# Patient Record
Sex: FEMALE | Race: OTHER | URBAN - METROPOLITAN AREA
[De-identification: names, ages, dates, MRNs, and addresses within clinical notes are randomized per-mention and may not be internally consistent; named-entity substitution may affect disease eponyms.]

---

## 2022-06-25 ENCOUNTER — EMERGENCY (EMERGENCY)
Facility: HOSPITAL | Age: 37
LOS: 0 days | Discharge: HOME | End: 2022-06-25
Attending: EMERGENCY MEDICINE | Admitting: EMERGENCY MEDICINE

## 2022-06-25 VITALS
OXYGEN SATURATION: 96 % | RESPIRATION RATE: 18 BRPM | HEART RATE: 89 BPM | DIASTOLIC BLOOD PRESSURE: 60 MMHG | SYSTOLIC BLOOD PRESSURE: 130 MMHG | TEMPERATURE: 98 F

## 2022-06-25 DIAGNOSIS — S92.354A NONDISPLACED FRACTURE OF FIFTH METATARSAL BONE, RIGHT FOOT, INITIAL ENCOUNTER FOR CLOSED FRACTURE: ICD-10-CM

## 2022-06-25 DIAGNOSIS — M79.89 OTHER SPECIFIED SOFT TISSUE DISORDERS: ICD-10-CM

## 2022-06-25 DIAGNOSIS — M79.671 PAIN IN RIGHT FOOT: ICD-10-CM
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
## 2022-06-25 DIAGNOSIS — S80.211A ABRASION, RIGHT KNEE, INITIAL ENCOUNTER: ICD-10-CM
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
## 2022-06-25 PROCEDURE — 99284 EMERGENCY DEPT VISIT MOD MDM: CPT | Mod: 25

## 2022-06-25 PROCEDURE — 73630 X-RAY EXAM OF FOOT: CPT | Mod: 26,RT

## 2022-06-25 PROCEDURE — 29515 APPLICATION SHORT LEG SPLINT: CPT

## 2022-06-25 RX ORDER — IBUPROFEN 200 MG
600 TABLET ORAL ONCE
Refills: 0 | Status: COMPLETED | OUTPATIENT
Start: 2022-06-25 | End: 2022-06-25

## 2022-06-25 RX ADMIN — Medication 600 MILLIGRAM(S): at 14:49

## 2022-06-25 NOTE — ED PROVIDER NOTE - CARE PROVIDER_API CALL
Jose Luis Coreas (MD)  Orthopaedic Surgery  [address] [place] [address]  Phone: [phone]  Fax: [phone]  Follow Up Time: 1-3 Days

## 2022-06-25 NOTE — ED PROVIDER NOTE - NSFOLLOWUPINSTRUCTIONS_ED_ALL_ED_FT
Metatarsal Fracture  A metatarsal fracture is a break in one of the five bones that connect the toes to the rest of the foot. This may also be called a forefoot fracture. A metatarsal fracture may be:  A crack in the surface of the bone (stress fracture). This often occurs in athletes.A break all the way through the bone (complete fracture).The bone that connects to the little toe (fifth metatarsal) is most commonly fractured. Ballet dancers often fracture this bone.  What are the causes?  A metatarsal fracture may be caused by:  Sudden twisting of the foot.Falling onto the foot.Something heavy falling onto the foot.Overuse or repetitive exercise.What increases the risk?  This condition is more likely to develop in people who:  Play contact sports.Do ballet.Have a condition that causes the bones to become thin and brittle (osteoporosis).Have a low calcium level.What are the signs or symptoms?  Symptoms of this condition include:  Pain that gets worse when walking or standing.Pain when pressing on the foot or moving the toes.Swelling.Bruising on the top or bottom of the foot.How is this diagnosed?  This condition may be diagnosed based on:  Your symptoms. Any recent foot injuries you have had. A physical exam.An X-ray of your foot. If you have a stress fracture, it may not show up on an X-ray, and you may need other imaging tests, such as:   A bone scan.CT scan. MRI.How is this treated?  Treatment depends on how severe your fracture is and how the pieces of the broken bone line up with each other (alignment). Treatment may involve:  Wearing a cast, splint, or supportive boot on your foot.Using crutches, and not putting any weight on your foot.Having surgery to align broken bones (open reduction and internal fixation, ORIF).Physical therapy.Follow-up visits and X-rays to make sure you are healing.Follow these instructions at home:  If you have a splint or a supportive boot:     Wear the splint or boot as told by your health care provider. Remove it only as told by your health care provider.Loosen the splint or boot if your toes tingle, become numb, or turn cold and blue. Keep the splint or boot clean. If your splint or boot is not waterproof:   Do not let it get wet. Cover it with a watertight covering when you take a bath or a shower.If you have a cast:     Do not stick anything inside the cast to scratch your skin. Doing that increases your risk for infection.Check the skin around the cast every day. Tell your health care provider about any concerns.You may put lotion on dry skin around the edges of the cast. Do not put lotion on the skin underneath the cast.Keep the cast clean.If the cast is not waterproof:   Do not let it get wet.Cover it with a watertight covering when you take a bath or a shower.Activity     Do not use your affected leg to support your body weight until your health care provider says that you can. Use crutches as directed.Ask your health care provider what activities are safe for you during recovery, and ask what activities you need to avoid.Do physical therapy exercises as directed.Driving     Do not drive or use heavy machinery while taking pain medicine.Do not drive while wearing a cast, splint, or boot on a foot that you use for driving.Managing pain, stiffness, and swelling        If directed, put ice on painful areas:  Put ice in a plastic bag.Place a towel between your skin and the bag.  If you have a removable splint or boot, remove it as told by your health care provider.If you have a cast, place a towel between your cast and the bag.Leave the ice on for 20 minutes, 2–3 times a day.Move your toes often to avoid stiffness and to lessen swelling.Raise (elevate) your lower leg above the level of your heart while you are sitting or lying down.General instructions     Do not put pressure on any part of the cast or splint until it is fully hardened. This may take several hours.Take over-the-counter and prescription medicines only as told by your health care provider.Do not use any products that contain nicotine or tobacco, such as cigarettes and e-cigarettes. These can delay bone healing. If you need help quitting, ask your health care provider.Do not take baths, swim, or use a hot tub until your health care provider approves. Ask your health care provider if you may take showers.Keep all follow-up visits as told by your health care provider. This is important.Contact a health care provider if you have:  Pain that gets worse or does not get better with medicine. A fever.A bad smell coming from your cast or splint.Get help right away if you have:  Any of the following in your toes or your foot, even after loosening your splint (if applicable):   Numbness. Tingling. Coldness.Blue skin. Redness or swelling that gets worse.Pain that suddenly becomes severe.Summary  A metatarsal fracture is a break in one of the five bones that connect the toes to the rest of the foot.Treatment depends on how severe your fracture is and how the pieces of the broken bone line up with each other (alignment). This may include wearing a cast, splint, or supportive boot, or using crutches. Sometimes surgery is needed to align the bones.Ice and elevate your foot to help lessen the pain and swelling.Make sure you know what symptoms should cause you to get help right away.This information is not intended to replace advice given to you by your health care provider. Make sure you discuss any questions you have with your health care provider.    Document Released: 09/09/2003 Document Revised: 04/09/2020 Document Reviewed: 01/14/2019  Elsevier Patient Education © 2020 [employer]

## 2022-06-25 NOTE — ED PROVIDER NOTE - SKIN, MLM
+ abrasion along anterior aspect of left knee ; remainder of visualized skin normal color for race, warm, dry and intact. No evidence of rash.

## 2022-06-25 NOTE — ED PROVIDER NOTE - CLINICAL SUMMARY MEDICAL DECISION MAKING FREE TEXT BOX
37 female here for fall with right foot pain and swelling. Found to have new metatarsal fracture. Had screening exam, supportive care, imaging, immobilization, symptoms improved, plan discussed with patient, will discharge with outpatient management and return and follow up instructions.

## 2022-06-25 NOTE — ED PROVIDER NOTE - NS ED ATTENDING STATEMENT MOD
This was a shared visit with the KARLY. I reviewed and verified the documentation and independently performed the documented:

## 2022-06-25 NOTE — ED PROVIDER NOTE - OBJECTIVE STATEMENT
36 y/o F, no significant PMHx, presents to the ED with complaints of right foot pain s/p mechanical fall yesterday. Patient tripped and fell with resultant swelling and tenderness along lateral aspect of right foot that is exacerbated upon ambulation 36 y/o F, no significant PMHx, presents to the ED with complaints of right foot pain s/p mechanical fall yesterday. Patient tripped and fell with resultant swelling and tenderness along lateral aspect of right foot that is exacerbated upon ambulation. She sustained abrasion to left knee; denies additional injuries including head trauma, ankle pain, knee pain and hip pain.

## 2022-06-25 NOTE — ED PROVIDER NOTE - MUSCULOSKELETAL, MLM
+ tenderness and swelling along lateral aspect of right foot ; full ROM exhibited to b/l knees, ankles and hips ; spine appears normal

## 2022-06-25 NOTE — ED PROVIDER NOTE - ATTENDING APP SHARED VISIT CONTRIBUTION OF CARE
I personally evaluated the patient. I reviewed the Resident’s or Physician Assistant’s note (as assigned above), and agree with the findings and plan except as documented in my note.     37 female here for mechanical trip and fall earlier today with injury to right foot lateral aspect and left knee abrasion. Admits to inversion injury at the right foot. Unable to bear weight in ED. Admits to swelling and bruising to the location.     Tetanus status unsure but had a child 5 years ago.     ROS otherwise unremarkable    PE: female in no distress. CV: pulses intact. CHEST: normal work of breathing. SKIN: left knee abrasion noted EXT: right foot lateral aspect bruising and swelling noted. tenderness to palpation at 5th and 4th MT area. Distally NVI.  NEURO: AAO 3 no focal deficits. HEENT: mucosa normal     Impression: fall, right foot injury    Plan: imaging wound management immobilization supportive care and reevaluation

## 2022-06-27 ENCOUNTER — APPOINTMENT (OUTPATIENT)
Dept: ORTHOPEDIC SURGERY | Facility: CLINIC | Age: 37
End: 2022-06-27

## 2022-06-27 VITALS — HEIGHT: 67 IN | WEIGHT: 200 LBS | BODY MASS INDEX: 31.39 KG/M2

## 2022-06-27 DIAGNOSIS — Z78.9 OTHER SPECIFIED HEALTH STATUS: ICD-10-CM

## 2022-06-27 DIAGNOSIS — S92.354A NONDISPLACED FRACTURE OF FIFTH METATARSAL BONE, RIGHT FOOT, INITIAL ENCOUNTER FOR CLOSED FRACTURE: ICD-10-CM

## 2022-06-27 PROCEDURE — 29540 STRAPPING ANKLE &/FOOT: CPT

## 2022-06-27 PROCEDURE — 99203 OFFICE O/P NEW LOW 30 MIN: CPT | Mod: 25

## 2022-06-27 NOTE — DISCUSSION/SUMMARY
[de-identified] :   At this time I placed her in a short Cam walker boot.  I discussed with her she can weightbear as tolerated in the boot.  She can take it off for sleeping and bathing.  Rest, ice, elevate, Tylenol or Motrin as needed for pain.  She can also do warm soaks with Epsom salts.  Will see her back in about 3 weeks for repeat x-rays and evaluation.  I discussed with her she will be in the boot for about 4 weeks. Patient will call me if any other problems or concerns.  Patient verbalized understanding and agreed with the plan, all questions were answered in the office today.\par \par This patient was seen under the supervision of Dr. Coreas.\par

## 2022-06-27 NOTE — IMAGING
[de-identified] : On examination of her right foot she has swelling and ecchymosis of the dorsal lateral aspect of her foot.  She has no erythema.  Skin is intact.  She is tender to palpation over the 5th metatarsal.  She is nontender over the rest of the metatarsals or the Lisfranc joint.  She has no tenderness palpation of the calcaneus.  She is nontender over the medial or lateral malleolus.  She is nontender over the ATFL, CFL, PT FL or deltoid ligament.  She is nontender over the talar dome.  She is nontender over the Achilles, negative Fitzgerald's test, no calf tenderness.  She has good range of motion of the ankle.  Sensation is intact throughout, 2+ DP and PT pulses.\par \par X-rays of the right foot taken at [facility], reviewed in the office today show a nondisplaced base of the 5th metatarsal fracture.  No other fractures, dislocations or other bony abnormalities noted.

## 2022-06-27 NOTE — HISTORY OF PRESENT ILLNESS
[de-identified] : 37-year-old female is here for evaluation of her right foot.  Patient states on Friday she was walking in the street and there was a pothole, she stepped in it, rolled her foot and fell.  After the fall she is having pain and difficulty walking, she went to the emergency room and had x-rays taken and was told she had a fracture.  She was placed in a splint.  She has been nonweightbearing with crutches.  She states she has not been taking anything for the pain because she is breast-feeding.  She denies any pain in the ankle.  She denies any numbness tingling in the foot or any calf pain.  She is here today for repeat evaluation.

## 2022-07-22 ENCOUNTER — APPOINTMENT (OUTPATIENT)
Dept: ORTHOPEDIC SURGERY | Facility: CLINIC | Age: 37
End: 2022-07-22

## 2022-07-22 VITALS — BODY MASS INDEX: 31.39 KG/M2 | HEIGHT: 67 IN | WEIGHT: 200 LBS

## 2022-07-22 PROCEDURE — 99213 OFFICE O/P EST LOW 20 MIN: CPT

## 2022-07-22 PROCEDURE — 73630 X-RAY EXAM OF FOOT: CPT | Mod: RT

## 2022-07-22 NOTE — IMAGING
[de-identified] : On examination of her right foot she has minimal swelling,   No ecchymosis  She has no erythema.  Skin is intact.  She is  mildly tender to palpation over the 5th metatarsal.  She is nontender over the rest of the metatarsals or the Lisfranc joint.  She has no tenderness palpation of the calcaneus.  She is nontender over the medial or lateral malleolus.  She is nontender over the ATFL, CFL, PT FL or deltoid ligament.  She is nontender over the talar dome.  She is nontender over the Achilles, negative Fitzgerald's test, no calf tenderness.  She has good range of motion of the ankle.  Sensation is intact throughout, 2+ DP and PT pulses.\par \par X-rays of the right foot, weight-bearing, taken in the office today show a well-healing base of the 5th metatarsal fracture.  The alignment is unchanged.  There is some more callus formation.

## 2022-07-22 NOTE — HISTORY OF PRESENT ILLNESS
[de-identified] : 37-year-old female is here for follow up evaluation of her right foot, 5th metatarsal fracture. Patient had an inversion injury of the foot about 4 weeks ago, she was seen in the walk-in.  I placed her in a CAM walker boot.  She has been weight-bearing in the boot.  She states the pain has improved significantly, she still has some mild pain but is doing well.  She denies any pain in the ankle.  She denies any numbness tingling in the foot or any calf pain.  She is here today for repeat evaluation.

## 2022-07-22 NOTE — DISCUSSION/SUMMARY
[de-identified] :   At this time she can come out of the Cam walker boot into a stiff-soled shoe.  She can continue to weightbear as tolerated.  No high impact activity.  I will see her back in 4-6 weeks for repeat x-rays and evaluation. Patient will call me if any other problems or concerns.  Patient verbalized understanding and agreed with the plan, all questions were answered in the office today.\par \par Patient was seen under the supervision of Dr. Patel.\par

## 2022-09-16 ENCOUNTER — RESULT CHARGE (OUTPATIENT)
Age: 37
End: 2022-09-16

## 2022-09-16 ENCOUNTER — APPOINTMENT (OUTPATIENT)
Dept: ORTHOPEDIC SURGERY | Facility: CLINIC | Age: 37
End: 2022-09-16

## 2022-09-16 VITALS — BODY MASS INDEX: 31.39 KG/M2 | HEIGHT: 67 IN | WEIGHT: 200 LBS

## 2022-09-16 DIAGNOSIS — S92.354D NONDISPLACED FRACTURE OF FIFTH METATARSAL BONE, RIGHT FOOT, SUBSEQUENT ENCOUNTER FOR FRACTURE WITH ROUTINE HEALING: ICD-10-CM

## 2022-09-16 PROCEDURE — 99213 OFFICE O/P EST LOW 20 MIN: CPT

## 2022-09-16 PROCEDURE — 73630 X-RAY EXAM OF FOOT: CPT | Mod: RT

## 2022-09-16 NOTE — DISCUSSION/SUMMARY
[de-identified] :   At this time I discussed with her she seems to also have some plantar fasciitis.  I recommend she continue to do the physical therapy and work on stretching of the foot.  She can roll her foot on a frozen water bottle or do some warm soaks with Epsom salts to help with the inflammation.  She is currently breast-feeding so she cannot take anti-inflammatories.  I will see her back in about 6 weeks for repeat x-rays and evaluation. Patient will call me if any other problems or concerns.  Patient verbalized understanding and agreed with the plan, all questions were answered in the office today.\par

## 2022-09-16 NOTE — IMAGING
[de-identified] : On examination of her right foot she has no swelling, no erythema, no ecchymosis..  Skin is intact.  She is  nontender to palpation over the 5th metatarsal.  She is nontender over the rest of the metatarsals or the Lisfranc joint.  She has  tenderness over the plantar fascia and the plantar aspect of the calcaneus. She is nontender over the medial or lateral malleolus.  She is nontender over the ATFL, CFL, PT FL or deltoid ligament.  She is nontender over the talar dome.  She is nontender over the Achilles, negative Fitzgerald's test, no calf tenderness.  She has good range of motion of the ankle.  Sensation is intact throughout, 2+ DP and PT pulses.\par \par X-rays of the right foot, weight-bearing, taken in the office today show a well-healing base of the 5th metatarsal fracture.  The alignment is unchanged.  There is some more callus formation.

## 2022-09-16 NOTE — HISTORY OF PRESENT ILLNESS
[de-identified] : 37-year-old female is here for follow up evaluation of her right foot, 5th metatarsal fracture. Patient had an inversion injury of the foot about 3 months ago.  She has been out of the boot and weight-bearing as tolerated.  She states she has no pain in the area of the fracture but has been having pain in the bottom of the foot and the heel.  She states she started going for physical therapy.  She denies any new injury or trauma.  She denies any numbness tingling in the foot or any calf pain.

## 2022-10-28 ENCOUNTER — APPOINTMENT (OUTPATIENT)
Dept: ORTHOPEDIC SURGERY | Facility: CLINIC | Age: 37
End: 2022-10-28

## 2023-10-13 ENCOUNTER — APPOINTMENT (OUTPATIENT)
Dept: UROGYNECOLOGY | Facility: CLINIC | Age: 38
End: 2023-10-13
Payer: MEDICAID

## 2023-10-13 VITALS
HEART RATE: 60 BPM | DIASTOLIC BLOOD PRESSURE: 65 MMHG | SYSTOLIC BLOOD PRESSURE: 101 MMHG | HEIGHT: 67 IN | WEIGHT: 200 LBS | BODY MASS INDEX: 31.39 KG/M2

## 2023-10-13 DIAGNOSIS — Z78.9 OTHER SPECIFIED HEALTH STATUS: ICD-10-CM

## 2023-10-13 DIAGNOSIS — B97.7 PAPILLOMAVIRUS AS THE CAUSE OF DISEASES CLASSIFIED ELSEWHERE: ICD-10-CM

## 2023-10-13 DIAGNOSIS — Z83.3 FAMILY HISTORY OF DIABETES MELLITUS: ICD-10-CM

## 2023-10-13 DIAGNOSIS — Z82.5 FAMILY HISTORY OF ASTHMA AND OTHER CHRONIC LOWER RESPIRATORY DISEASES: ICD-10-CM

## 2023-10-13 DIAGNOSIS — N39.3 STRESS INCONTINENCE (FEMALE) (MALE): ICD-10-CM

## 2023-10-13 DIAGNOSIS — R35.1 NOCTURIA: ICD-10-CM

## 2023-10-13 DIAGNOSIS — Z80.3 FAMILY HISTORY OF MALIGNANT NEOPLASM OF BREAST: ICD-10-CM

## 2023-10-13 DIAGNOSIS — Z80.0 FAMILY HISTORY OF MALIGNANT NEOPLASM OF DIGESTIVE ORGANS: ICD-10-CM

## 2023-10-13 DIAGNOSIS — N94.89 OTHER SPECIFIED CONDITIONS ASSOCIATED WITH FEMALE GENITAL ORGANS AND MENSTRUAL CYCLE: ICD-10-CM

## 2023-10-13 DIAGNOSIS — R10.2 PELVIC AND PERINEAL PAIN: ICD-10-CM

## 2023-10-13 PROCEDURE — 51701 INSERT BLADDER CATHETER: CPT

## 2023-10-13 PROCEDURE — 99205 OFFICE O/P NEW HI 60 MIN: CPT | Mod: 25

## 2023-10-13 RX ORDER — CYCLOBENZAPRINE HYDROCHLORIDE 5 MG/1
5 TABLET, FILM COATED ORAL
Qty: 60 | Refills: 0 | Status: ACTIVE | COMMUNITY
Start: 2023-10-13 | End: 1900-01-01

## 2023-10-16 LAB
APPEARANCE: CLEAR
BILIRUBIN URINE: NEGATIVE
BLOOD URINE: NEGATIVE
COLOR: YELLOW
GLUCOSE QUALITATIVE U: NEGATIVE MG/DL
KETONES URINE: NEGATIVE MG/DL
LEUKOCYTE ESTERASE URINE: NEGATIVE
NITRITE URINE: NEGATIVE
PH URINE: 6.5
PROTEIN URINE: NEGATIVE MG/DL
SPECIFIC GRAVITY URINE: 1.01
URINE CULTURE <10: NORMAL
UROBILINOGEN URINE: 0.2 MG/DL

## 2023-11-20 ENCOUNTER — APPOINTMENT (OUTPATIENT)
Dept: UROGYNECOLOGY | Facility: CLINIC | Age: 38
End: 2023-11-20

## 2024-01-20 ENCOUNTER — EMERGENCY (EMERGENCY)
Facility: HOSPITAL | Age: 39
LOS: 0 days | Discharge: HOME | End: 2024-01-21
Attending: EMERGENCY MEDICINE
Payer: MEDICAID

## 2024-01-20 VITALS
TEMPERATURE: 99 F | WEIGHT: 199.96 LBS | OXYGEN SATURATION: 98 % | HEART RATE: 83 BPM | RESPIRATION RATE: 18 BRPM | SYSTOLIC BLOOD PRESSURE: 137 MMHG | DIASTOLIC BLOOD PRESSURE: 81 MMHG
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
## 2024-01-20 DIAGNOSIS — S51.012A LACERATION WITHOUT FOREIGN BODY OF LEFT ELBOW, INITIAL ENCOUNTER: ICD-10-CM

## 2024-01-20 DIAGNOSIS — Z23 ENCOUNTER FOR IMMUNIZATION: ICD-10-CM

## 2024-01-20 DIAGNOSIS — M25.522 PAIN IN LEFT ELBOW: ICD-10-CM

## 2024-01-20 PROCEDURE — 99284 EMERGENCY DEPT VISIT MOD MDM: CPT | Mod: 25

## 2024-01-20 PROCEDURE — 73080 X-RAY EXAM OF ELBOW: CPT | Mod: 26,LT

## 2024-01-20 PROCEDURE — 73080 X-RAY EXAM OF ELBOW: CPT | Mod: LT

## 2024-01-20 PROCEDURE — 90471 IMMUNIZATION ADMIN: CPT

## 2024-01-20 PROCEDURE — 90715 TDAP VACCINE 7 YRS/> IM: CPT

## 2024-01-20 PROCEDURE — 12002 RPR S/N/AX/GEN/TRNK2.6-7.5CM: CPT

## 2024-01-20 PROCEDURE — 99283 EMERGENCY DEPT VISIT LOW MDM: CPT | Mod: 25

## 2024-01-20 RX ORDER — TETANUS TOXOID, REDUCED DIPHTHERIA TOXOID AND ACELLULAR PERTUSSIS VACCINE, ADSORBED 5; 2.5; 8; 8; 2.5 [IU]/.5ML; [IU]/.5ML; UG/.5ML; UG/.5ML; UG/.5ML
0.5 SUSPENSION INTRAMUSCULAR ONCE
Refills: 0 | Status: COMPLETED | OUTPATIENT
Start: 2024-01-20 | End: 2024-01-20

## 2024-01-20 RX ORDER — CEPHALEXIN 500 MG
500 CAPSULE ORAL ONCE
Refills: 0 | Status: COMPLETED | OUTPATIENT
Start: 2024-01-20 | End: 2024-01-20

## 2024-01-20 RX ADMIN — TETANUS TOXOID, REDUCED DIPHTHERIA TOXOID AND ACELLULAR PERTUSSIS VACCINE, ADSORBED 0.5 MILLILITER(S): 5; 2.5; 8; 8; 2.5 SUSPENSION INTRAMUSCULAR at 22:15

## 2024-01-20 NOTE — ED ADULT TRIAGE NOTE - CHIEF COMPLAINT QUOTE
Patient presents with c/o of L elbow pain and laceration.  Reports she fell down two steps.  Denies LOC -HT

## 2024-01-21 RX ORDER — CEPHALEXIN 500 MG
1 CAPSULE ORAL
Qty: 28 | Refills: 0
Start: 2024-01-21 | End: 2024-01-27

## 2024-01-21 RX ADMIN — Medication 500 MILLIGRAM(S): at 00:29

## 2024-01-21 NOTE — ED PROVIDER NOTE - ADDITIONAL NOTES AND INSTRUCTIONS:
Ms Hernandez was seen in the Emergency Department on 1/21/24 and can return to school or work by the listed date with activity as tolerated.

## 2024-01-21 NOTE — ED PROVIDER NOTE - PHYSICAL EXAMINATION
As Follows:  CONST: Well appearing in NAD  EYES: PERRL, EOMI, Sclera and conjunctiva clear.   CARD: No murmurs, rubs, or gallops; Normal rate and rhythm  RESP: BS Equal B/L, No wheezes, rhonchi or rales. No distress or accessory breathing  GI: Soft, non-tender, non-distended.  MS: Full RoM of elbow, nontender pelvis, chest wall, LUE. Normal ROM in all extremities. No midline Cervical/Thoracic/Lumbar spinal tenderness.  SKIN: 3cm elbow laceration, partial. Warm, dry, no acute rashes. MMM  NEURO: A&Ox3, No focal deficits. Strength and sensation intact. Steady gait

## 2024-01-21 NOTE — ED PROVIDER NOTE - NSFOLLOWUPINSTRUCTIONS_ED_ALL_ED_FT
FOLLOW UP AND RETURN TO THE EMERGENCY DEPARTMENT in 10 days for removal of 2 sutures.         Our Emergency Department Referral Coordinators will be reaching out to you in the next 24-48 hours from 9:00am to 5:00pm with a follow up appointment. Please expect a phone call from the hospital in that time frame. If you do not receive a call or if you have any questions or concerns, you can reach them at   [phone]        Laceration    WHAT YOU NEED TO KNOW:    A laceration is an injury to the skin and the soft tissue underneath it. Lacerations happen when you are cut or hit by something. They can happen anywhere on the body.     DISCHARGE INSTRUCTIONS:    Return to the emergency department if:     You have heavy bleeding or bleeding that does not stop after 10 minutes of holding firm, direct pressure over the wound.       Your wound opens up.     Contact your healthcare provider if:     You have a fever or chills.       Your laceration is red, warm, or swollen.      You have red streaks on your skin coming from your wound.      You have white or yellow drainage from the wound that smells bad.      You have pain that gets worse, even after treatment.       You have questions or concerns about your condition or care.     Medicines:     Prescription pain medicine may be given. Ask how to take this medicine safely.       Antibiotics help treat or prevent a bacterial infection.       Take your medicine as directed. Contact your healthcare provider if you think your medicine is not helping or if you have side effects. Tell him or her if you are allergic to any medicine. Keep a list of the medicines, vitamins, and herbs you take. Include the amounts, and when and why you take them. Bring the list or the pill bottles to follow-up visits. Carry your medicine list with you in case of an emergency.    Care for your wound as directed:     Do not get your wound wet until your healthcare provider says it is okay. Do not soak your wound in water. Do not go swimming until your healthcare provider says it is okay. Carefully wash the wound with soap and water. Gently pat the area dry or allow it to air dry.       Change your bandages when they get wet, dirty, or after washing. Apply new, clean bandages as directed. Do not apply elastic bandages or tape too tight. Do not put powders or lotions over your incision.       Apply antibiotic ointment as directed. Your healthcare provider may give you antibiotic ointment to put over your wound if you have stitches. If you have strips of tape over your incision, let them dry up and fall off on their own. If they do not fall off within 14 days, gently remove them. If you have glue over your wound, do not remove or pick at it. If your glue comes off, do not replace it with glue that you have at home.       Check your wound every day for signs of infection such as swelling, redness, or pus.     Self-care:     Apply ice on your wound for 15 to 20 minutes every hour or as directed. Use an ice pack, or put crushed ice in a plastic bag. Cover it with a towel. Ice helps prevent tissue damage and decreases swelling and pain.      Use a splint as directed. A splint will decrease movement and stress on your wound. It may help it heal faster. A splint may be used for lacerations over joints or areas of your body that bend. Ask your healthcare provider how to apply and remove a splint.       Decrease scarring of your wound by applying ointments as directed. Do not apply ointments until your healthcare provider says it is okay. You may need to wait until your wound is healed. Ask which ointment to buy and how often to use it. After your wound is healed, use sunscreen over the area when you are out in the sun. You should do this for at least 6 months to 1 year after your injury.     Follow up with your healthcare provider as directed: You may need to follow up in 24 to 48 hours to have your wound checked for infection. You will need to return in 3 to 14 days if you have stitches or staples so they can be removed. Care for your wound as directed to prevent infection and help it heal. Write down your questions so you remember to ask them during your visits.

## 2024-01-21 NOTE — ED PROVIDER NOTE - PATIENT PORTAL LINK FT
You can access the FollowMyHealth Patient Portal offered by [facility] by registering at the following website: [email or web address]. By joining [employer]’s FollowMyHealth portal, you will also be able to view your health information using other applications (apps) compatible with our system.

## 2024-01-21 NOTE — ED PROCEDURE NOTE - ATTENDING APP SHARED VISIT CONTRIBUTION OF CARE
Patient permission obtained for wound care. procedure performed by DIANNE Brown and supervised by me.   Patient tolerated procedure well and no complications noted. wound care instructions provided to patient in detail.

## 2024-01-21 NOTE — ED PROVIDER NOTE - OBJECTIVE STATEMENT
Patient is a 38 year old female presents for evaluation of left elbow pain/laceration after slip and fall down 5 steps. Patient fell onto her buttocks and admits to minor ache there without any other trauma or injuries. She did not hit her head, have LoC, or any other complaints at this time. She does not recall her last tetanus.

## 2024-01-21 NOTE — ED PROVIDER NOTE - ATTENDING APP SHARED VISIT CONTRIBUTION OF CARE
Patient states that, accidentally fell and landed on her buttocks and hit Lt elbow. Denies any other injuries. Patient is c/o LT elbow pain with small laceration. Patient denies any lower back pain/hip pain. Denies head/neck pain. Patient has been ambulatory since the fall, and is c/o LT elbow wound and pain in the area of the wound. Denies pain in the buttock area, just feels soreness only.   Vitals reviewed.   Patient is awake, alert, answering questions appropriately, appears comfortable and not in any distress.  Lungs: CTA, no wheezing, no crackles.  LUE exam: LT elbow has small superficial wound, partial thickness skin laceration, no active bleeding noted, deeper structures are intact, +pain on ROM of the Lt elbow, stable elbow joint and LUE is distally NVI.   No pain on ROM Of the hips. No tenderness of the C-T-LS spine.   Patient is ambulatory in ED and is comfortable.   A/P: Lt elbow superficial laceration, deeper structures are intact,   x-rays, wound care,   reevaluation.

## 2024-07-10 ENCOUNTER — EMERGENCY (EMERGENCY)
Facility: HOSPITAL | Age: 39
LOS: 0 days | Discharge: HOME | End: 2024-07-10
Attending: EMERGENCY MEDICINE
Payer: MEDICAID

## 2024-07-10 VITALS
OXYGEN SATURATION: 99 % | HEART RATE: 79 BPM | TEMPERATURE: 98 F | RESPIRATION RATE: 18 BRPM | DIASTOLIC BLOOD PRESSURE: 82 MMHG | SYSTOLIC BLOOD PRESSURE: 122 MMHG
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
## 2024-07-10 DIAGNOSIS — S91.011A LACERATION WITHOUT FOREIGN BODY, RIGHT ANKLE, INITIAL ENCOUNTER: ICD-10-CM
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
## 2024-07-10 PROCEDURE — 90715 TDAP VACCINE 7 YRS/> IM: CPT

## 2024-07-10 PROCEDURE — 99283 EMERGENCY DEPT VISIT LOW MDM: CPT | Mod: 25

## 2024-07-10 PROCEDURE — 12001 RPR S/N/AX/GEN/TRNK 2.5CM/<: CPT

## 2024-07-10 PROCEDURE — 99284 EMERGENCY DEPT VISIT MOD MDM: CPT | Mod: 25

## 2024-07-10 RX ORDER — TETANUS TOXOID, REDUCED DIPHTHERIA TOXOID AND ACELLULAR PERTUSSIS VACCINE, ADSORBED 5; 2.5; 8; 8; 2.5 [IU]/.5ML; [IU]/.5ML; UG/.5ML; UG/.5ML; UG/.5ML
0.5 SUSPENSION INTRAMUSCULAR ONCE
Refills: 0 | Status: COMPLETED | OUTPATIENT
Start: 2024-07-10 | End: 2024-07-10

## 2024-07-10 RX ADMIN — Medication 600 MILLIGRAM(S): at 22:41

## 2024-08-02 ENCOUNTER — APPOINTMENT (OUTPATIENT)
Dept: OTOLARYNGOLOGY | Facility: CLINIC | Age: 39
End: 2024-08-02
Payer: MEDICAID

## 2024-08-02 PROCEDURE — 92524 BEHAVRAL QUALIT ANALYS VOICE: CPT | Mod: GN,59

## 2024-08-02 PROCEDURE — 92507 TX SP LANG VOICE COMM INDIV: CPT | Mod: GN,59

## 2024-08-09 ENCOUNTER — APPOINTMENT (OUTPATIENT)
Dept: OTOLARYNGOLOGY | Facility: CLINIC | Age: 39
End: 2024-08-09

## 2024-08-09 PROCEDURE — 92507 TX SP LANG VOICE COMM INDIV: CPT | Mod: GN

## 2024-08-14 ENCOUNTER — APPOINTMENT (OUTPATIENT)
Dept: OTOLARYNGOLOGY | Facility: CLINIC | Age: 39
End: 2024-08-14
Payer: MEDICAID

## 2024-08-14 PROCEDURE — 92507 TX SP LANG VOICE COMM INDIV: CPT | Mod: GN

## 2024-08-19 ENCOUNTER — APPOINTMENT (OUTPATIENT)
Dept: OTOLARYNGOLOGY | Facility: CLINIC | Age: 39
End: 2024-08-19
Payer: MEDICAID

## 2024-08-19 PROCEDURE — 92507 TX SP LANG VOICE COMM INDIV: CPT | Mod: GN

## 2024-08-29 ENCOUNTER — APPOINTMENT (OUTPATIENT)
Dept: OTOLARYNGOLOGY | Facility: CLINIC | Age: 39
End: 2024-08-29

## 2024-09-10 ENCOUNTER — APPOINTMENT (OUTPATIENT)
Dept: OTOLARYNGOLOGY | Facility: CLINIC | Age: 39
End: 2024-09-10
Payer: MEDICAID

## 2024-09-10 PROCEDURE — 92507 TX SP LANG VOICE COMM INDIV: CPT | Mod: GN

## 2024-09-17 ENCOUNTER — APPOINTMENT (OUTPATIENT)
Dept: OTOLARYNGOLOGY | Facility: CLINIC | Age: 39
End: 2024-09-17
Payer: MEDICAID

## 2024-09-17 PROCEDURE — 92507 TX SP LANG VOICE COMM INDIV: CPT | Mod: GN

## 2024-09-24 ENCOUNTER — APPOINTMENT (OUTPATIENT)
Dept: OTOLARYNGOLOGY | Facility: CLINIC | Age: 39
End: 2024-09-24
Payer: MEDICAID

## 2024-09-24 PROCEDURE — 92507 TX SP LANG VOICE COMM INDIV: CPT

## 2024-10-01 ENCOUNTER — APPOINTMENT (OUTPATIENT)
Dept: OTOLARYNGOLOGY | Facility: CLINIC | Age: 39
End: 2024-10-01

## 2024-10-08 ENCOUNTER — NON-APPOINTMENT (OUTPATIENT)
Age: 39
End: 2024-10-08

## 2024-10-08 ENCOUNTER — APPOINTMENT (OUTPATIENT)
Dept: OTOLARYNGOLOGY | Facility: CLINIC | Age: 39
End: 2024-10-08

## 2024-10-29 ENCOUNTER — APPOINTMENT (OUTPATIENT)
Dept: OTOLARYNGOLOGY | Facility: CLINIC | Age: 39
End: 2024-10-29
Payer: MEDICAID

## 2024-10-29 ENCOUNTER — APPOINTMENT (OUTPATIENT)
Dept: OTOLARYNGOLOGY | Facility: CLINIC | Age: 39
End: 2024-10-29

## 2024-10-29 PROCEDURE — 92507 TX SP LANG VOICE COMM INDIV: CPT | Mod: GN

## 2024-11-05 ENCOUNTER — APPOINTMENT (OUTPATIENT)
Dept: OTOLARYNGOLOGY | Facility: CLINIC | Age: 39
End: 2024-11-05

## 2024-11-12 ENCOUNTER — APPOINTMENT (OUTPATIENT)
Dept: OTOLARYNGOLOGY | Facility: CLINIC | Age: 39
End: 2024-11-12

## 2024-11-19 ENCOUNTER — APPOINTMENT (OUTPATIENT)
Dept: OTOLARYNGOLOGY | Facility: CLINIC | Age: 39
End: 2024-11-19

## 2024-11-26 ENCOUNTER — APPOINTMENT (OUTPATIENT)
Dept: OTOLARYNGOLOGY | Facility: CLINIC | Age: 39
End: 2024-11-26